# Patient Record
Sex: MALE | Race: WHITE | Employment: UNEMPLOYED | URBAN - METROPOLITAN AREA
[De-identification: names, ages, dates, MRNs, and addresses within clinical notes are randomized per-mention and may not be internally consistent; named-entity substitution may affect disease eponyms.]

---

## 2019-01-16 ENCOUNTER — HOSPITAL ENCOUNTER (INPATIENT)
Facility: CLINIC | Age: 34
LOS: 3 days | Discharge: HOME | DRG: 871 | End: 2019-01-19
Attending: EMERGENCY MEDICINE | Admitting: HOSPITALIST
Payer: COMMERCIAL

## 2019-01-16 ENCOUNTER — APPOINTMENT (OUTPATIENT)
Dept: CT IMAGING | Facility: CLINIC | Age: 34
DRG: 871 | End: 2019-01-16
Payer: COMMERCIAL

## 2019-01-16 ENCOUNTER — TRANSFERRED RECORDS (OUTPATIENT)
Dept: HEALTH INFORMATION MANAGEMENT | Facility: CLINIC | Age: 34
End: 2019-01-16

## 2019-01-16 DIAGNOSIS — J18.9 PNEUMONIA OF RIGHT MIDDLE LOBE DUE TO INFECTIOUS ORGANISM: ICD-10-CM

## 2019-01-16 DIAGNOSIS — E87.1 HYPONATREMIA: ICD-10-CM

## 2019-01-16 DIAGNOSIS — K85.90 ACUTE PANCREATITIS, UNSPECIFIED COMPLICATION STATUS, UNSPECIFIED PANCREATITIS TYPE: ICD-10-CM

## 2019-01-16 LAB
ALBUMIN SERPL-MCNC: 2.8 G/DL (ref 3.4–5)
ALP SERPL-CCNC: 114 U/L (ref 40–150)
ALT SERPL W P-5'-P-CCNC: 83 U/L (ref 0–70)
ANION GAP SERPL CALCULATED.3IONS-SCNC: 12 MMOL/L (ref 3–14)
AST SERPL W P-5'-P-CCNC: 42 U/L (ref 0–45)
BASOPHILS # BLD AUTO: 0 10E9/L (ref 0–0.2)
BASOPHILS NFR BLD AUTO: 0.1 %
BILIRUB SERPL-MCNC: 1 MG/DL (ref 0.2–1.3)
BUN SERPL-MCNC: 13 MG/DL (ref 7–30)
CALCIUM SERPL-MCNC: 8.2 MG/DL (ref 8.5–10.1)
CHLORIDE SERPL-SCNC: 91 MMOL/L (ref 94–109)
CO2 SERPL-SCNC: 22 MMOL/L (ref 20–32)
CREAT SERPL-MCNC: 0.84 MG/DL (ref 0.66–1.25)
DIFFERENTIAL METHOD BLD: ABNORMAL
EOSINOPHIL # BLD AUTO: 0 10E9/L (ref 0–0.7)
EOSINOPHIL NFR BLD AUTO: 0 %
ERYTHROCYTE [DISTWIDTH] IN BLOOD BY AUTOMATED COUNT: 11.7 % (ref 10–15)
GFR SERPL CREATININE-BSD FRML MDRD: >90 ML/MIN/{1.73_M2}
GLUCOSE SERPL-MCNC: 118 MG/DL (ref 70–99)
HCT VFR BLD AUTO: 33.5 % (ref 40–53)
HGB BLD-MCNC: 12 G/DL (ref 13.3–17.7)
IMM GRANULOCYTES # BLD: 0.1 10E9/L (ref 0–0.4)
IMM GRANULOCYTES NFR BLD: 0.4 %
LIPASE SERPL-CCNC: 964 U/L (ref 73–393)
LYMPHOCYTES # BLD AUTO: 1.1 10E9/L (ref 0.8–5.3)
LYMPHOCYTES NFR BLD AUTO: 6.4 %
MCH RBC QN AUTO: 30.1 PG (ref 26.5–33)
MCHC RBC AUTO-ENTMCNC: 35.8 G/DL (ref 31.5–36.5)
MCV RBC AUTO: 84 FL (ref 78–100)
MONOCYTES # BLD AUTO: 1.5 10E9/L (ref 0–1.3)
MONOCYTES NFR BLD AUTO: 9.3 %
NEUTROPHILS # BLD AUTO: 13.8 10E9/L (ref 1.6–8.3)
NEUTROPHILS NFR BLD AUTO: 83.8 %
NRBC # BLD AUTO: 0 10*3/UL
NRBC BLD AUTO-RTO: 0 /100
PLATELET # BLD AUTO: 267 10E9/L (ref 150–450)
POTASSIUM SERPL-SCNC: 3.4 MMOL/L (ref 3.4–5.3)
PROT SERPL-MCNC: 7.2 G/DL (ref 6.8–8.8)
RBC # BLD AUTO: 3.99 10E12/L (ref 4.4–5.9)
SODIUM SERPL-SCNC: 125 MMOL/L (ref 133–144)
WBC # BLD AUTO: 16.5 10E9/L (ref 4–11)

## 2019-01-16 PROCEDURE — 25000128 H RX IP 250 OP 636: Performed by: EMERGENCY MEDICINE

## 2019-01-16 PROCEDURE — 83690 ASSAY OF LIPASE: CPT | Performed by: EMERGENCY MEDICINE

## 2019-01-16 PROCEDURE — 74177 CT ABD & PELVIS W/CONTRAST: CPT

## 2019-01-16 PROCEDURE — 99223 1ST HOSP IP/OBS HIGH 75: CPT | Mod: AI | Performed by: HOSPITALIST

## 2019-01-16 PROCEDURE — 25000125 ZZHC RX 250: Performed by: EMERGENCY MEDICINE

## 2019-01-16 PROCEDURE — 85025 COMPLETE CBC W/AUTO DIFF WBC: CPT | Performed by: EMERGENCY MEDICINE

## 2019-01-16 PROCEDURE — 99285 EMERGENCY DEPT VISIT HI MDM: CPT | Mod: 25

## 2019-01-16 PROCEDURE — 25000132 ZZH RX MED GY IP 250 OP 250 PS 637: Performed by: EMERGENCY MEDICINE

## 2019-01-16 PROCEDURE — 96361 HYDRATE IV INFUSION ADD-ON: CPT

## 2019-01-16 PROCEDURE — 96367 TX/PROPH/DG ADDL SEQ IV INF: CPT

## 2019-01-16 PROCEDURE — 12000000 ZZH R&B MED SURG/OB

## 2019-01-16 PROCEDURE — 96365 THER/PROPH/DIAG IV INF INIT: CPT | Mod: 59

## 2019-01-16 PROCEDURE — 80053 COMPREHEN METABOLIC PANEL: CPT | Performed by: EMERGENCY MEDICINE

## 2019-01-16 RX ORDER — LORATADINE 10 MG/1
10 TABLET ORAL DAILY
COMMUNITY

## 2019-01-16 RX ORDER — HYDROMORPHONE HYDROCHLORIDE 1 MG/ML
0.5 INJECTION, SOLUTION INTRAMUSCULAR; INTRAVENOUS; SUBCUTANEOUS ONCE
Status: COMPLETED | OUTPATIENT
Start: 2019-01-16 | End: 2019-01-16

## 2019-01-16 RX ORDER — MULTIVITAMIN,THERAPEUTIC
1 TABLET ORAL DAILY
COMMUNITY

## 2019-01-16 RX ORDER — CEFTRIAXONE 2 G/1
2 INJECTION, POWDER, FOR SOLUTION INTRAMUSCULAR; INTRAVENOUS ONCE
Status: COMPLETED | OUTPATIENT
Start: 2019-01-16 | End: 2019-01-16

## 2019-01-16 RX ORDER — SODIUM CHLORIDE 9 MG/ML
1000 INJECTION, SOLUTION INTRAVENOUS CONTINUOUS
Status: DISCONTINUED | OUTPATIENT
Start: 2019-01-16 | End: 2019-01-17

## 2019-01-16 RX ORDER — IOPAMIDOL 755 MG/ML
103 INJECTION, SOLUTION INTRAVASCULAR ONCE
Status: COMPLETED | OUTPATIENT
Start: 2019-01-16 | End: 2019-01-16

## 2019-01-16 RX ORDER — CARBAMAZEPINE 200 MG/1
400 TABLET ORAL AT BEDTIME
COMMUNITY

## 2019-01-16 RX ORDER — CARBAMAZEPINE 200 MG/1
200 TABLET ORAL EVERY MORNING
COMMUNITY

## 2019-01-16 RX ORDER — IBUPROFEN 600 MG/1
600 TABLET, FILM COATED ORAL ONCE
Status: COMPLETED | OUTPATIENT
Start: 2019-01-16 | End: 2019-01-16

## 2019-01-16 RX ADMIN — SODIUM CHLORIDE 71 ML: 9 INJECTION, SOLUTION INTRAVENOUS at 21:57

## 2019-01-16 RX ADMIN — SODIUM CHLORIDE 500 MG: 9 INJECTION, SOLUTION INTRAVENOUS at 23:00

## 2019-01-16 RX ADMIN — IOPAMIDOL 103 ML: 755 INJECTION, SOLUTION INTRAVENOUS at 21:57

## 2019-01-16 RX ADMIN — HYDROMORPHONE HYDROCHLORIDE 0.5 MG: 1 INJECTION, SOLUTION INTRAMUSCULAR; INTRAVENOUS; SUBCUTANEOUS at 23:36

## 2019-01-16 RX ADMIN — SODIUM CHLORIDE 1000 ML: 9 INJECTION, SOLUTION INTRAVENOUS at 21:22

## 2019-01-16 RX ADMIN — IBUPROFEN 600 MG: 600 TABLET ORAL at 23:36

## 2019-01-16 RX ADMIN — CEFTRIAXONE SODIUM 2 G: 2 INJECTION, POWDER, FOR SOLUTION INTRAMUSCULAR; INTRAVENOUS at 22:30

## 2019-01-16 RX ADMIN — SODIUM CHLORIDE 1000 ML: 9 INJECTION, SOLUTION INTRAVENOUS at 22:31

## 2019-01-16 SDOH — HEALTH STABILITY: MENTAL HEALTH: HOW OFTEN DO YOU HAVE A DRINK CONTAINING ALCOHOL?: NEVER

## 2019-01-16 ASSESSMENT — ENCOUNTER SYMPTOMS
VOMITING: 0
DIARRHEA: 0
NAUSEA: 1
ABDOMINAL PAIN: 1
FEVER: 1

## 2019-01-16 ASSESSMENT — MIFFLIN-ST. JEOR: SCORE: 1811.29

## 2019-01-17 ENCOUNTER — APPOINTMENT (OUTPATIENT)
Dept: CARDIOLOGY | Facility: CLINIC | Age: 34
DRG: 871 | End: 2019-01-17
Payer: COMMERCIAL

## 2019-01-17 ENCOUNTER — APPOINTMENT (OUTPATIENT)
Dept: ULTRASOUND IMAGING | Facility: CLINIC | Age: 34
DRG: 871 | End: 2019-01-17
Payer: COMMERCIAL

## 2019-01-17 ENCOUNTER — APPOINTMENT (OUTPATIENT)
Dept: GENERAL RADIOLOGY | Facility: CLINIC | Age: 34
DRG: 871 | End: 2019-01-17
Payer: COMMERCIAL

## 2019-01-17 PROBLEM — A41.9 SEPSIS DUE TO PNEUMONIA (H): Status: ACTIVE | Noted: 2019-01-17

## 2019-01-17 PROBLEM — J18.9 SEPSIS DUE TO PNEUMONIA (H): Status: ACTIVE | Noted: 2019-01-17

## 2019-01-17 LAB
ALBUMIN SERPL-MCNC: 2.3 G/DL (ref 3.4–5)
ALP SERPL-CCNC: 100 U/L (ref 40–150)
ALT SERPL W P-5'-P-CCNC: 59 U/L (ref 0–70)
ANION GAP SERPL CALCULATED.3IONS-SCNC: 11 MMOL/L (ref 3–14)
AST SERPL W P-5'-P-CCNC: 20 U/L (ref 0–45)
BASOPHILS # BLD AUTO: 0 10E9/L (ref 0–0.2)
BASOPHILS NFR BLD AUTO: 0.1 %
BILIRUB DIRECT SERPL-MCNC: 1 MG/DL (ref 0–0.2)
BILIRUB SERPL-MCNC: 1.4 MG/DL (ref 0.2–1.3)
BUN SERPL-MCNC: 9 MG/DL (ref 7–30)
CALCIUM SERPL-MCNC: 7.7 MG/DL (ref 8.5–10.1)
CHLORIDE SERPL-SCNC: 99 MMOL/L (ref 94–109)
CO2 SERPL-SCNC: 21 MMOL/L (ref 20–32)
CREAT SERPL-MCNC: 0.84 MG/DL (ref 0.66–1.25)
DIFFERENTIAL METHOD BLD: ABNORMAL
EOSINOPHIL # BLD AUTO: 0 10E9/L (ref 0–0.7)
EOSINOPHIL NFR BLD AUTO: 0 %
ERYTHROCYTE [DISTWIDTH] IN BLOOD BY AUTOMATED COUNT: 12 % (ref 10–15)
FLUAV+FLUBV RNA SPEC QL NAA+PROBE: NEGATIVE
FLUAV+FLUBV RNA SPEC QL NAA+PROBE: NEGATIVE
GFR SERPL CREATININE-BSD FRML MDRD: >90 ML/MIN/{1.73_M2}
GLUCOSE SERPL-MCNC: 94 MG/DL (ref 70–99)
HCT VFR BLD AUTO: 31.2 % (ref 40–53)
HGB BLD-MCNC: 10.9 G/DL (ref 13.3–17.7)
IMM GRANULOCYTES # BLD: 0.1 10E9/L (ref 0–0.4)
IMM GRANULOCYTES NFR BLD: 0.5 %
LACTATE BLD-SCNC: 0.6 MMOL/L (ref 0.7–2)
LIPASE SERPL-CCNC: 511 U/L (ref 73–393)
LYMPHOCYTES # BLD AUTO: 1.1 10E9/L (ref 0.8–5.3)
LYMPHOCYTES NFR BLD AUTO: 5.8 %
MCH RBC QN AUTO: 29.5 PG (ref 26.5–33)
MCHC RBC AUTO-ENTMCNC: 34.9 G/DL (ref 31.5–36.5)
MCV RBC AUTO: 85 FL (ref 78–100)
MONOCYTES # BLD AUTO: 1.8 10E9/L (ref 0–1.3)
MONOCYTES NFR BLD AUTO: 10 %
NEUTROPHILS # BLD AUTO: 15.3 10E9/L (ref 1.6–8.3)
NEUTROPHILS NFR BLD AUTO: 83.6 %
NRBC # BLD AUTO: 0 10*3/UL
NRBC BLD AUTO-RTO: 0 /100
PLATELET # BLD AUTO: 267 10E9/L (ref 150–450)
POTASSIUM SERPL-SCNC: 3.5 MMOL/L (ref 3.4–5.3)
PROCALCITONIN SERPL-MCNC: 0.96 NG/ML
PROT SERPL-MCNC: 6.2 G/DL (ref 6.8–8.8)
RBC # BLD AUTO: 3.69 10E12/L (ref 4.4–5.9)
RSV RNA SPEC NAA+PROBE: NEGATIVE
SODIUM SERPL-SCNC: 131 MMOL/L (ref 133–144)
SPECIMEN SOURCE: NORMAL
WBC # BLD AUTO: 18.3 10E9/L (ref 4–11)

## 2019-01-17 PROCEDURE — C9113 INJ PANTOPRAZOLE SODIUM, VIA: HCPCS | Performed by: INTERNAL MEDICINE

## 2019-01-17 PROCEDURE — 99233 SBSQ HOSP IP/OBS HIGH 50: CPT | Performed by: INTERNAL MEDICINE

## 2019-01-17 PROCEDURE — 25000132 ZZH RX MED GY IP 250 OP 250 PS 637: Performed by: HOSPITALIST

## 2019-01-17 PROCEDURE — 40000264 ECHOCARDIOGRAM COMPLETE

## 2019-01-17 PROCEDURE — 25000128 H RX IP 250 OP 636: Performed by: INTERNAL MEDICINE

## 2019-01-17 PROCEDURE — 36415 COLL VENOUS BLD VENIPUNCTURE: CPT | Performed by: HOSPITALIST

## 2019-01-17 PROCEDURE — 76705 ECHO EXAM OF ABDOMEN: CPT

## 2019-01-17 PROCEDURE — 80048 BASIC METABOLIC PNL TOTAL CA: CPT | Performed by: HOSPITALIST

## 2019-01-17 PROCEDURE — 85025 COMPLETE CBC W/AUTO DIFF WBC: CPT | Performed by: HOSPITALIST

## 2019-01-17 PROCEDURE — 36415 COLL VENOUS BLD VENIPUNCTURE: CPT | Performed by: INTERNAL MEDICINE

## 2019-01-17 PROCEDURE — 12000000 ZZH R&B MED SURG/OB

## 2019-01-17 PROCEDURE — 93306 TTE W/DOPPLER COMPLETE: CPT | Mod: 26 | Performed by: INTERNAL MEDICINE

## 2019-01-17 PROCEDURE — 84145 PROCALCITONIN (PCT): CPT | Performed by: HOSPITALIST

## 2019-01-17 PROCEDURE — 71046 X-RAY EXAM CHEST 2 VIEWS: CPT

## 2019-01-17 PROCEDURE — 83690 ASSAY OF LIPASE: CPT | Performed by: HOSPITALIST

## 2019-01-17 PROCEDURE — 80076 HEPATIC FUNCTION PANEL: CPT | Performed by: HOSPITALIST

## 2019-01-17 PROCEDURE — 87040 BLOOD CULTURE FOR BACTERIA: CPT | Performed by: INTERNAL MEDICINE

## 2019-01-17 PROCEDURE — 87631 RESP VIRUS 3-5 TARGETS: CPT | Performed by: INTERNAL MEDICINE

## 2019-01-17 PROCEDURE — 25000128 H RX IP 250 OP 636: Performed by: HOSPITALIST

## 2019-01-17 PROCEDURE — 25500064 ZZH RX 255 OP 636: Performed by: HOSPITALIST

## 2019-01-17 PROCEDURE — 83605 ASSAY OF LACTIC ACID: CPT | Performed by: HOSPITALIST

## 2019-01-17 RX ORDER — AMOXICILLIN 250 MG
2 CAPSULE ORAL 2 TIMES DAILY PRN
Status: DISCONTINUED | OUTPATIENT
Start: 2019-01-17 | End: 2019-01-19 | Stop reason: HOSPADM

## 2019-01-17 RX ORDER — CEFTRIAXONE 2 G/1
2 INJECTION, POWDER, FOR SOLUTION INTRAMUSCULAR; INTRAVENOUS EVERY 24 HOURS
Status: DISCONTINUED | OUTPATIENT
Start: 2019-01-17 | End: 2019-01-19 | Stop reason: HOSPADM

## 2019-01-17 RX ORDER — SODIUM CHLORIDE 9 MG/ML
INJECTION, SOLUTION INTRAVENOUS CONTINUOUS
Status: DISCONTINUED | OUTPATIENT
Start: 2019-01-17 | End: 2019-01-17

## 2019-01-17 RX ORDER — ACETAMINOPHEN 650 MG/1
650 SUPPOSITORY RECTAL EVERY 4 HOURS PRN
Status: DISCONTINUED | OUTPATIENT
Start: 2019-01-17 | End: 2019-01-19 | Stop reason: HOSPADM

## 2019-01-17 RX ORDER — LIDOCAINE 40 MG/G
CREAM TOPICAL
Status: DISCONTINUED | OUTPATIENT
Start: 2019-01-17 | End: 2019-01-19 | Stop reason: HOSPADM

## 2019-01-17 RX ORDER — SODIUM CHLORIDE 9 MG/ML
INJECTION, SOLUTION INTRAVENOUS CONTINUOUS
Status: DISCONTINUED | OUTPATIENT
Start: 2019-01-17 | End: 2019-01-19 | Stop reason: HOSPADM

## 2019-01-17 RX ORDER — POLYETHYLENE GLYCOL 3350 17 G/17G
17 POWDER, FOR SOLUTION ORAL DAILY PRN
Status: DISCONTINUED | OUTPATIENT
Start: 2019-01-17 | End: 2019-01-19 | Stop reason: HOSPADM

## 2019-01-17 RX ORDER — HYDROMORPHONE HYDROCHLORIDE 1 MG/ML
0.2 INJECTION, SOLUTION INTRAMUSCULAR; INTRAVENOUS; SUBCUTANEOUS
Status: DISCONTINUED | OUTPATIENT
Start: 2019-01-17 | End: 2019-01-19 | Stop reason: HOSPADM

## 2019-01-17 RX ORDER — ONDANSETRON 4 MG/1
4 TABLET, ORALLY DISINTEGRATING ORAL EVERY 6 HOURS PRN
Status: DISCONTINUED | OUTPATIENT
Start: 2019-01-17 | End: 2019-01-19 | Stop reason: HOSPADM

## 2019-01-17 RX ORDER — BISACODYL 10 MG
10 SUPPOSITORY, RECTAL RECTAL DAILY PRN
Status: DISCONTINUED | OUTPATIENT
Start: 2019-01-17 | End: 2019-01-19 | Stop reason: HOSPADM

## 2019-01-17 RX ORDER — ONDANSETRON 2 MG/ML
4 INJECTION INTRAMUSCULAR; INTRAVENOUS EVERY 6 HOURS PRN
Status: DISCONTINUED | OUTPATIENT
Start: 2019-01-17 | End: 2019-01-19 | Stop reason: HOSPADM

## 2019-01-17 RX ORDER — ALBUTEROL SULFATE 0.83 MG/ML
3 SOLUTION RESPIRATORY (INHALATION)
Status: DISCONTINUED | OUTPATIENT
Start: 2019-01-17 | End: 2019-01-19 | Stop reason: HOSPADM

## 2019-01-17 RX ORDER — CARBAMAZEPINE 200 MG/1
200 TABLET ORAL EVERY MORNING
Status: DISCONTINUED | OUTPATIENT
Start: 2019-01-17 | End: 2019-01-19 | Stop reason: HOSPADM

## 2019-01-17 RX ORDER — NALOXONE HYDROCHLORIDE 0.4 MG/ML
.1-.4 INJECTION, SOLUTION INTRAMUSCULAR; INTRAVENOUS; SUBCUTANEOUS
Status: DISCONTINUED | OUTPATIENT
Start: 2019-01-17 | End: 2019-01-19 | Stop reason: HOSPADM

## 2019-01-17 RX ORDER — OXYCODONE HYDROCHLORIDE 5 MG/1
5-10 TABLET ORAL
Status: DISCONTINUED | OUTPATIENT
Start: 2019-01-17 | End: 2019-01-19 | Stop reason: HOSPADM

## 2019-01-17 RX ORDER — OSELTAMIVIR PHOSPHATE 75 MG/1
75 CAPSULE ORAL 2 TIMES DAILY
Status: DISCONTINUED | OUTPATIENT
Start: 2019-01-17 | End: 2019-01-17

## 2019-01-17 RX ORDER — ACETAMINOPHEN 325 MG/1
650 TABLET ORAL EVERY 4 HOURS PRN
Status: DISCONTINUED | OUTPATIENT
Start: 2019-01-17 | End: 2019-01-19 | Stop reason: HOSPADM

## 2019-01-17 RX ORDER — BISACODYL 10 MG
10 SUPPOSITORY, RECTAL RECTAL DAILY PRN
Status: DISCONTINUED | OUTPATIENT
Start: 2019-01-17 | End: 2019-01-17

## 2019-01-17 RX ORDER — AMOXICILLIN 250 MG
1 CAPSULE ORAL 2 TIMES DAILY PRN
Status: DISCONTINUED | OUTPATIENT
Start: 2019-01-17 | End: 2019-01-19 | Stop reason: HOSPADM

## 2019-01-17 RX ORDER — LANOLIN ALCOHOL/MO/W.PET/CERES
3 CREAM (GRAM) TOPICAL
Status: DISCONTINUED | OUTPATIENT
Start: 2019-01-17 | End: 2019-01-19 | Stop reason: HOSPADM

## 2019-01-17 RX ORDER — CARBAMAZEPINE 200 MG/1
400 TABLET ORAL AT BEDTIME
Status: DISCONTINUED | OUTPATIENT
Start: 2019-01-17 | End: 2019-01-19 | Stop reason: HOSPADM

## 2019-01-17 RX ADMIN — CARBAMAZEPINE 200 MG: 200 TABLET ORAL at 09:54

## 2019-01-17 RX ADMIN — CEFTRIAXONE SODIUM 2 G: 2 INJECTION, POWDER, FOR SOLUTION INTRAMUSCULAR; INTRAVENOUS at 21:01

## 2019-01-17 RX ADMIN — PANTOPRAZOLE SODIUM 40 MG: 40 INJECTION, POWDER, FOR SOLUTION INTRAVENOUS at 20:44

## 2019-01-17 RX ADMIN — OXYCODONE HYDROCHLORIDE 10 MG: 5 TABLET ORAL at 01:29

## 2019-01-17 RX ADMIN — Medication 10 MG: at 18:30

## 2019-01-17 RX ADMIN — SODIUM CHLORIDE: 9 INJECTION, SOLUTION INTRAVENOUS at 01:36

## 2019-01-17 RX ADMIN — PANTOPRAZOLE SODIUM 40 MG: 40 INJECTION, POWDER, FOR SOLUTION INTRAVENOUS at 18:29

## 2019-01-17 RX ADMIN — HUMAN ALBUMIN MICROSPHERES AND PERFLUTREN 9 ML: 10; .22 INJECTION, SOLUTION INTRAVENOUS at 13:52

## 2019-01-17 RX ADMIN — AZITHROMYCIN MONOHYDRATE 250 MG: 500 INJECTION, POWDER, LYOPHILIZED, FOR SOLUTION INTRAVENOUS at 21:52

## 2019-01-17 RX ADMIN — OXYCODONE HYDROCHLORIDE 5 MG: 5 TABLET ORAL at 20:52

## 2019-01-17 RX ADMIN — CARBAMAZEPINE 400 MG: 200 TABLET ORAL at 18:29

## 2019-01-17 RX ADMIN — SODIUM CHLORIDE: 9 INJECTION, SOLUTION INTRAVENOUS at 14:23

## 2019-01-17 RX ADMIN — GUAIFENESIN 10 ML: 200 SOLUTION ORAL at 01:29

## 2019-01-17 ASSESSMENT — ACTIVITIES OF DAILY LIVING (ADL)
ADLS_ACUITY_SCORE: 12
ADLS_ACUITY_SCORE: 14
ADLS_ACUITY_SCORE: 14
ADLS_ACUITY_SCORE: 12
ADLS_ACUITY_SCORE: 14

## 2019-01-17 NOTE — PLAN OF CARE
A&O x4, very anxious.  Tachycardic on RA.  Frequent cough, denies SOB.  Reports abd pain, managed with oxycodone.  Pt is blind.  Regular diet, tolerating well.  Up with SBA.  Plan for echocardiogram in AM.

## 2019-01-17 NOTE — PROVIDER NOTIFICATION
MD Notification    Notified Person: MD    Notified Person Name: Gordon    Notification Date/Time: 01/17/19 @ 4:43 PM     Notification Interaction: web paged MD    Purpose of Notification: Influenza PCR negative, can we discontinue tamiflu and droplet isolation? Thank you!    Orders Received: MD discontinued tamiflu    Comments: Pt never received dose of tamiflu, as it was not available from pharmacy.

## 2019-01-17 NOTE — PROVIDER NOTIFICATION
MD Notification    Notified Person: MD    Notified Person Name: Gordon    Notification Date/Time: 01/17/19 @ 5:57 PM     Notification Interaction: web paged    Purpose of Notification: pt complaining of increased abdominal pain with eating, wondering if MD wants patient NPO or on clear liquids.    Orders Received: Change patient to clear liquid diet. Offer dulcolax suppository, as patient has not had a good BM since admission. Added IV protonix.    Comments: Pt's abdominal distention is bother him - feels like it is getting worse.      6:05 PM   Per MD if patient continues to have worsening pain, ask house MD to come assess patient as he cannot communicate his symptoms as well.

## 2019-01-17 NOTE — PROGRESS NOTES
[facility]    Hospitalist Progress Note    Assessment & Plan   Chance Louis is a markedly pleasant 33 year old gentleman with childhood hydrocephalus status post  shunt placement, as well as seizure disorder and mild cognitive deficits who presents with cough, fever and abdominal pain and is found to have suspected sepsis due to right lower lobe pneumonia.     Sepsis due to right lower lobe pneumonia:   -Possibly post-viral by history.   -Check influenza PCR, empirically start on Tamiflu until results are back  -Chest x-ray consistent with pneumonia of the medial right lower lobe  -Continue ceftriaxone and azithromycin  -Blood culture today  -Monitor fever profile  -Not hypoxic at this time  -Leukocytosis mildly worse today, monitor that closely.    Probable acute pancreatitis  -Presenting with nonspecific abdominal pain and lipase of 964   -Unclear if this is due to acute pancreatitis or other etiologies  -Patient unsure if his pain is due to incessant coughing in the last few days   -Transaminases are now normal however bilirubin is mildly elevated  -Right upper quadrant ultrasonogram is unremarkable, no cholelithiasis although CBD is not adequately visualized  -Check LFTs in the morning  -Patient already on a regular diet and tolerating well without any worsening of pain  -Continue IV fluids   -If bilirubin not improving or worse GI consult in the morning    Hyponatremia:  -Most likely hypovolemic  -Sodium at presentation was 125  -Improved to 130 with IV hydration     Normocytic anemia:   -Baseline appears to be normal between 14-15  -Hemoglobin at presentation was 12, slightly lower today at 10.9, likely dilutional, continue to monitor.     Seizure disorder  Mental retardation  Congenital hydrocephalus status post   shunt placement in the remote past  Legally blind  -Continue prior to admission Tegretol      # Pain Assessment:  Current Pain Score 1/17/2019   Patient currently in pain?  yes   Pain score (0-10) 10   - Chance is experiencing pain due to abdominal pain. Pain management was discussed with Chance and his mother and the plan was created in a collaborative fashion.  Chance's response to the current recommendations: engaged  - Please see the plan for pain management as documented above        D/W: RN  DVT Prophylaxis: Pneumatic Compression Devices  Code Status: Full Code    Disposition: Expected discharge in 2 + days    Mike Leyva MD    Interval History    Febrile this morning at 100.8 with a T-max of 102.3.  Endorses dry cough.  Generalized abdominal pain made worse by coughing.  No dysuria.  Feels a little distended and bloated.    -Data reviewed today: I reviewed all new labs and imaging results over the last 24 hours. I personally reviewed no images or EKG's today.      Physical Exam   Temp: 100.8  F (38.2  C) Temp src: Oral BP: 121/62 Pulse: 132 Heart Rate: 124 Resp: 18 SpO2: 94 % O2 Device: None (Room air)    Vitals:    01/16/19 2055   Weight: 93.4 kg (206 lb)     Vital Signs with Ranges  Temp:  [99.4  F (37.4  C)-102.3  F (39.1  C)] 100.8  F (38.2  C)  Pulse:  [127-132] 132  Heart Rate:  [123-133] 124  Resp:  [16-18] 18  BP: (121-144)/() 121/62  SpO2:  [94 %-99 %] 94 %  I/O last 3 completed shifts:  In: 952 [P.O.:600; I.V.:352]  Out: -     Constitutional: AAOX3, NAD, appears anxious  HEENT:  No pallor or icterus  Neck- Supple, Good ROM, No JVD  Respiratory:  No crackles, No wheezes, CTA B/L, Normal WOB  Cardiovascular: Tachycardic, No murmur  GI: Soft, slightly distended, mild nonspecific generalized tenderness, no guarding or rebound or rigidity, bowel sounds normoactive  Skin/Integument: Warm and dry, no rashes  MSK: No joint deformity or swelling, no edema  Neuro: CN- grossly intact       Medications       sodium chloride 75 mL/hr at 01/17/19 0136         sodium chloride 0.9%  1,000 mL Intravenous Once     azithromycin  250 mg Intravenous Q24H      carBAMazepine  200 mg Oral QAM     carBAMazepine  400 mg Oral At Bedtime     cefTRIAXone  2 g Intravenous Q24H     sodium chloride (PF)  3 mL Intracatheter Q8H       Data     Recent Labs   Lab 01/17/19  0732 01/16/19  2110   WBC 18.3* 16.5*   HGB 10.9* 12.0*   MCV 85 84    267   * 125*   POTASSIUM 3.5 3.4   CHLORIDE 99 91*   CO2 21 22   BUN 9 13   CR 0.84 0.84   ANIONGAP 11 12   IDALIA 7.7* 8.2*   GLC 94 118*   ALBUMIN 2.3* 2.8*   PROTTOTAL 6.2* 7.2   BILITOTAL 1.4* 1.0   ALKPHOS 100 114   ALT 59 83*   AST 20 42   LIPASE 511* 964*       Recent Results (from the past 24 hour(s))   CT Abdomen Pelvis w Contrast    Narrative    CT ABDOMEN AND PELVIS WITH CONTRAST   1/16/2019 10:02 PM     HISTORY: Abdominal pain, distention and fever. Elevated white blood  cell count.    COMPARISON: None.    TECHNIQUE: Following the uneventful administration of 103 mL  Isovue-370 intravenous contrast, helical sections were acquired from  the top of the diaphragm through the pubic symphysis. Coronal  reconstructions were generated. Radiation dose for this scan was  reduced using automated exposure control, adjustment of the mA and/or  kV according to the patient's size, or iterative reconstruction  technique.    FINDINGS:     Abdomen: The liver, spleen, pancreas, adrenal glands and kidneys are  unremarkable. The gallbladder is present. No enlarged lymph nodes or  free fluid in the upper abdomen. A ventriculoperitoneal shunt catheter  is present coursing in the right anterior abdominal wall with distal  tip in the upper right hemiabdomen.    Scan through the lower chest is significant for partial visualization  of a small to moderate-sized region of airspace opacities in the  medial aspect of the right lower lobe.    Pelvis: The small and large bowel are normal in caliber. The appendix  is unremarkable. No bowel wall thickening, pneumatosis or free  intraperitoneal gas. No enlarged lymph nodes or free fluid in the  pelvis.       Impression    IMPRESSION:   1. Partial visualization of a small to moderate-sized region of  airspace opacities in the right lung base, likely representing  pneumonia.  2. No other cause of acute pain identified in the abdomen or pelvis.  The appendix is unremarkable.    MERRY MCKEON MD   XR Chest 2 Views    Narrative    CHEST TWO VIEWS  1/17/2019 8:37 AM     HISTORY: 33-year-old patient with pneumonia suspected on CT exam.    COMPARISON: None       Impression    IMPRESSION: Consolidation is noted in the medial right lower lobe  corresponding to pneumonia. Recommend continued radiographic  surveillance until resolution. No pneumothorax or pleural effusion.  Catheter is noted overlying the right hemithorax, discontinuous  overlying the right lung apex, likely a previous  shunt.    ROGELIO MEZA MD   US Abdomen Limited    Narrative    RIGHT UPPER QUADRANT ULTRASOUND 1/17/2019 9:03 AM    HISTORY:  Elevated lipase.    COMPARISON: CT of the abdomen pelvis dated 1/16/2019    FINDINGS:    Gallbladder: Suboptimal visualization but grossly unremarkable.    Bile ducts:  The common bile duct is not able to be visualized.    Liver:  Unremarkable    Pancreas:  The tail is obscured by overlying bowel gas. Head and body  of the pancreas appear grossly unremarkable.    Right kidney:  Unremarkable    Abdominal aorta/inferior vena cava Unremarkable      Impression    IMPRESSION:  No definite acute abnormality. The common bile duct is  not adequately visualized but appeared unremarkable on the previous  CT.    KATARINA SINGH MD

## 2019-01-17 NOTE — PROGRESS NOTES
RECEIVING UNIT ED HANDOFF REVIEW    ED Nurse Handoff Report was reviewed by: Yasmani Dove on January 17, 2019 at 12:00 AM

## 2019-01-17 NOTE — ED NOTES
"[facility]  ED Nurse Handoff Report    ED Chief complaint: Abdominal Pain      ED Diagnosis:   Final diagnoses:   Pneumonia of right middle lobe due to infectious organism (H)   Acute pancreatitis, unspecified complication status, unspecified pancreatitis type   Hyponatremia       Code Status: Full Code    Allergies: No Known Allergies    Activity level - Baseline/Home:  Independent    Activity Level - Current:   Independent     Needed?: No    Isolation: No  Infection: Not Applicable  Bariatric?: No    Vital Signs:   Vitals:    01/16/19 2055 01/16/19 2232   BP: (!) 134/104 (!) 144/91   Resp: 18 16   Temp: 99.4  F (37.4  C)    TempSrc: Oral    SpO2: 96% 98%   Weight: 93.4 kg (206 lb)    Height: 1.651 m (5' 5\")        Cardiac Rhythm: ,        Pain level:      Is this patient confused?: No Patient is blind  Does this patient have a guardian?  Yes         If yes, is there guardianship documents in the Epic \"Code/ACP\" activity?  No but parents present and state guardianship[         Guardian Notified?  Yes  [place] - Suicide Severity Rating Scale Completed?  Yes  If yes, what color did the patient score?  White    Patient Report: Initial Complaint: Abdominal pain  Focused Assessment: Pt states entire abdomen but focus on right lower quad is tender starting on Sunday. Pt felt nauseated but has not vomited. Stating pain 10/10. Pt had hydrocephalus as a baby and is on seizure medications. Pt lives with his parents and is blind.   Tests Performed: labs and imaging  Abnormal Results: see labs and imaging  Treatments provided: antibiotics, fluids 2L    Family Comments: Pt has not been feeling wee for a couple of days    OBS brochure/video discussed/provided to patient/family: N/A              Name of person given brochure if not patient: none              Relationship to patient: none    ED Medications:   Medications   0.9% sodium chloride BOLUS (1,000 mLs Intravenous New Bag 1/16/19 2122)     " Followed by   sodium chloride 0.9% infusion (1,000 mLs Intravenous New Bag 1/16/19 2231)   cefTRIAXone (ROCEPHIN) 2 g vial to attach to  ml bag for ADULTS or NS 50 ml bag for PEDS (2 g Intravenous New Bag 1/16/19 2230)   azithromycin (ZITHROMAX) 500 mg in sodium chloride 0.9 % 250 mL intermittent infusion (not administered)   Saline Flush - CT (71 mLs Intravenous Given 1/16/19 2157)   iopamidol (ISOVUE-370) solution 103 mL (103 mLs Intravenous Given 1/16/19 2157)       Drips infusing?:  Yes fluids and antibiotics    For the majority of the shift this patient was Green.   Interventions performed were none.    Severe Sepsis OR Septic Shock Diagnosis Present: No    To be done/followed up on inpatient unit:  continue to monitor for pain and fever. Pt 99.4 in ED after taking tylenol, antibiotics and possible US of belly    ED NURSE PHONE NUMBER: [phone]

## 2019-01-17 NOTE — PROGRESS NOTES
[facility] Progress Note  [facility]    Visit with pt, per request in chart.  Pt was alone, lying in bed, when I arrived.    Pt tearfully shared with me his understanding of his medical situation.  He is very fearful about all that is going on in the hospital, specifically noting a test/scan which frightened him.  (It seems possible that his fear is exacerbated by his vision impairment and also perhaps cognitive issues.)    Pt said he's never been in the hospital before, and in conversation he was unable to recall other times when he's experienced difficulty and recovery.    Pt is Religious, and he said that he may have visitors today including his parents and perhaps elders from the [employer] he [employer].    Pt was receptive to my offer of prayer, gentle touch (hand-holding) and breath work.    Provided support and reassurance.  SH team is available and I will attempt to visit again tomorrow if pt remains hospitalized.                                                                                                                                               Diana Poole M.A.  Staff   Pager [phone]  Phone [phone]

## 2019-01-17 NOTE — H&P
[facility]    History and Physical  Hospitalist       Date of Admission:  1/16/2019  Date of Service (when I saw the patient): 01/16/19    ASSESSMENT  Chance Louis is a markedly pleasant 33 year old gentleman with childhood hydrocephalus status post  shunt placement, as well as seizure disorder and mild cognitive deficits who presents with cough, fever and abdominal pain and is found to have suspected sepsis due to right lower lobe pneumonia.    PLAN    1) Sepsis due to right lower lobe pneumonia: Possibly post-viral by history. He has tachycardia, leukocytosis and right baser infiltrate as well as concomitant mild metabolic acidosis.       -- Inpatient. Albuterol nebs every 2 hours as needed. Ceftriaxone and Azithromycin continued as we follow up sputum cultures.. Monitor oxygenation. Robitussin ordered as needed for cough. Tylenol, Oxycodone, IV Dilaudid as needed for pain. Anti-emetics as needed.       -- Check urine strep and Legionella antigens     --  If headache persists, could consider a shunt series for further evaluation    2) Anemia: Unknown baseline; monitor while hospitalized    3) Hypochloremic hyponatremia: Could be hypovolemic. 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 Could also be due to SIADH due to pneumonia.        -- 75 ml/hour NS IV fluid ordered; follow up AM labs closely; consider further testing if abnormalities persist    4) Weight gain, abdominal distension, dyspnea: Could have CHF due to acute illness.      -- TTE ordered. Monitor sats. Stop fluid if respiratory symptoms worsen.    5) Elevated Lipase and isolated elevation in ALT: cause unclear; given abdominal pain pancreatitis considered, but CT was negative for signs of that and his pain could be muscle strain due to coughing.      -- Monitor; repeat CMP in AM       -- Consider abdominal ultrasound if pain continues    6) Seizure disorder: resume Tegretol when verified    7) Panic attacks: Would order  "1 mg Ativan if needed    Chief Complaint   Cough    History is obtained from the patient, his mother and other family members at the bedside, and the ED physician whom I have spoken with    History of Present Illness   Chance Louis is a markedly pleasant 33 year old gentleman who presents with 4 days of sudden onset, progressively worsening severe dry cough; he feels he can not get any sputum up. Multiple family members with whom he lives have recently had similar symptoms. He has had associated loose stool as well as nausea without emesis. A day or so after the cough started he noticed onset of diffuse sharp abdominal pain that is exacerbated by the cough or by movement. He has also noticed fevers and intermittent dull headaches, as well as severe thirst and drinking a lot of water at home, whereby he feels he has gained 5-10 pounds in the past few days and has also developed shortness of breath, especially when he exerts himself.. His family add that he had hydrocephalus as a child and had two seizures after shunt placement so was started on tegretol but hasn't had another seizure in many years. He has not needed to see a Neurologist in a long time. He has severe chronic anxiety and mild chronic cognitive deficits and his mother adds that she has also become his legal guardian. He has never smoked and drinks alcohol occasionally, but hasn't had any for at least a month. Otherwise he denies any other acute complaints. They went to an Urgent care first here high WBC and CXR abnormalities were noted so they were advised to come to our ED.    In the ED, Blood pressure (!) 144/91, temperature 99.4  F (37.4  C), temperature source Oral, resp. rate 16, height 1.651 m (5' 5\"), weight 93.4 kg (206 lb), SpO2 98 %.    Heart rates 110-130 in the ED.    CBC and CMP were done and notable for WBC 16.5, HGB 12, na 125, Cl 91, CO2 22, ALT 83, Albumin 2.8, otherwise in normal range. Lipase elevated at 964. CT abdomen and " "pelvis showed a large right base infiltrate in the lung without other acute pathology. He was given Iv fluid, Ceftriaxone, and Azithromycin.    Data   Labs Ordered and Resulted from Time of ED Arrival Up to the Time of Departure from the ED   CBC WITH PLATELETS DIFFERENTIAL - Abnormal; Notable for the following components:       Result Value    WBC 16.5 (*)     RBC Count 3.99 (*)     Hemoglobin 12.0 (*)     Hematocrit 33.5 (*)     Absolute Neutrophil 13.8 (*)     Absolute Monocytes 1.5 (*)     All other components within normal limits   COMPREHENSIVE METABOLIC PANEL - Abnormal; Notable for the following components:    Sodium 125 (*)     Chloride 91 (*)     Glucose 118 (*)     Calcium 8.2 (*)     Albumin 2.8 (*)     ALT 83 (*)     All other components within normal limits   LIPASE - Abnormal; Notable for the following components:    Lipase 964 (*)     All other components within normal limits   PERIPHERAL IV CATHETER   FREE WATER       PHYSICAL EXAM  Blood pressure (!) 144/91, temperature 99.4  F (37.4  C), temperature source Oral, resp. rate 16, height 1.651 m (5' 5\"), weight 93.4 kg (206 lb), SpO2 98 %.  Constitutional: Alert and oriented to person, place and time; no apparent distress  HEENT: normocephalic moist mucus membranes  Respiratory: lungs have bi-basilar crackles to auscultation bilaterally  Cardiovascular: regular tachycardic S1 S2   GI: abdomen soft but diffusely tender mildly distended bowel sounds positive  Lymph/Hematologic: no pallor, no cervical lymphadenopathy  Skin: no rash, good turgor  Musculoskeletal: no clubbing, cyanosis or edema  Neurologic: extra-ocular muscles intact; moves all four extremities  Psychiatric: anxious affect, speech tangential at times    Data reviewed today:  I personally reviewed the abdominal CT image(s) showing right lung pneumonia.    Recent Results (from the past 24 hour(s))   CT Abdomen Pelvis w Contrast    Narrative    CT ABDOMEN AND PELVIS WITH CONTRAST   1/16/2019 " 10:02 PM     HISTORY: Abdominal pain, distention and fever. Elevated white blood  cell count.    COMPARISON: None.    TECHNIQUE: Following the uneventful administration of 103 mL  Isovue-370 intravenous contrast, helical sections were acquired from  the top of the diaphragm through the pubic symphysis. Coronal  reconstructions were generated. Radiation dose for this scan was  reduced using automated exposure control, adjustment of the mA and/or  kV according to the patient's size, or iterative reconstruction  technique.    FINDINGS:     Abdomen: The liver, spleen, pancreas, adrenal glands and kidneys are  unremarkable. Gallbladder is present. No enlarged lymph nodes or free  fluid in the upper abdomen. A ventriculoperitoneal shunt catheter is  present coursing in the right anterior abdominal wall with distal tip  in the upper right hemiabdomen.    Scan through the lower chest is significant for partial visualization  of a small to moderate-sized region of airspace opacities in the  medial aspect of the right lower lobe.    Pelvis: The small and large bowel are normal in caliber. The appendix  is unremarkable. No bowel wall thickening, pneumatosis or free  intraperitoneal gas. No enlarged lymph nodes or free fluid in the  pelvis.      Impression    IMPRESSION:   1. Partial visualization of a small to moderate-sized region of  airspace opacities in the right lung base, likely representing  pneumonia.  2. No other cause of acute pain identified in the abdomen or pelvis.  The appendix is unremarkable.       DVT Prophylaxis: Pneumatic Compression Devices  Code Status: Full Code confirmed    Disposition: Expected discharge in 2-3 days    Jorge Thomason MD    Primary Care Physician   *No primary care provider on file.    Past Medical History    I have reviewed this patient's medical history and updated it with pertinent information if needed.   Past Medical History:   Diagnosis Date     Cognitive deficits       Hydrocephalus      Panic attacks      Seizure disorder (H)        Past Surgical History   I have reviewed this patient's surgical history and updated it with pertinent information if needed.  Past Surgical History:   Procedure Laterality Date     HERNIA REPAIR      as an infant     ventriculoperitoneal shunt         Prior to Admission Medications   Prior to Admission Medications   Prescriptions Last Dose Informant Patient Reported? Taking?   Cholecalciferol (VITAMIN D PO) 1/16/2019 at Unknown time  Yes Yes   Sig: Take 1 tablet by mouth daily   carBAMazepine (TEGRETOL) 200 MG tablet 1/16/2019 at Unknown time  Yes Yes   Sig: Take 200 mg by mouth every morning   carBAMazepine (TEGRETOL) 200 MG tablet 1/15/2019 at Unknown time  Yes Yes   Sig: Take 400 mg by mouth At Bedtime   loratadine (CLARITIN) 10 MG tablet 1/16/2019 at Unknown time  Yes Yes   Sig: Take 10 mg by mouth daily   multivitamin, therapeutic (THERA-VIT) TABS tablet 1/16/2019 at Unknown time  Yes Yes   Sig: Take 1 tablet by mouth daily      Facility-Administered Medications: None     Allergies   No Known Allergies    Social History   I have reviewed this patient's social history and updated it with pertinent information if needed. Chance Louis  reports that  has never smoked. He does not have any smokeless tobacco history on file. He reports that he drinks alcohol.    Family History   Family history assessed and, except as above, is non-contributory.    Review of Systems   The 10 point Review of Systems is negative other than noted in the HPI or here.

## 2019-01-17 NOTE — ED TRIAGE NOTES
Pt coming from Urgent care and sent here with fever and abdominal pain that started Sunday and progressively worse. Pt states 10/10 pain pointing all across the bbelly. Denies diarrhea. Pt had had hydrocephalus as a baby and on tegretol. Pt fever 99.4

## 2019-01-17 NOTE — ED NOTES
MD alerted that pt remains febrile and his heart rate is still elevated. New verbal orders obtained by Dr. Pierre

## 2019-01-17 NOTE — PHARMACY-ADMISSION MEDICATION HISTORY
Admission medication history interview status for the 1/16/2019  admission is complete. See EPIC admission navigator for prior to admission medications     Medication history source reliability:Moderate    Actions taken by pharmacist (provider contacted, etc):None     Additional medication history information not noted on PTA med list :  --Med list per print out sent with pt to ED from urgent care. The print out has med doses and all the meds newly entered and family confirmed it.  Family confirms that pt is on Tegretol 2 tablets at hs and 1 tab in the AM.     Medication reconciliation/reorder completed by provider prior to medication history? No    Time spent in this activity: 12 min    Prior to Admission medications    Medication Sig Last Dose Taking? Auth Provider   carBAMazepine (TEGRETOL) 200 MG tablet Take 200 mg by mouth every morning 1/16/2019 at Unknown time Yes Unknown, Entered By History   carBAMazepine (TEGRETOL) 200 MG tablet Take 400 mg by mouth At Bedtime 1/15/2019 at Unknown time Yes Unknown, Entered By History   Cholecalciferol (VITAMIN D PO) Take 1 tablet by mouth daily 1/16/2019 at Unknown time Yes Unknown, Entered By History   loratadine (CLARITIN) 10 MG tablet Take 10 mg by mouth daily 1/16/2019 at Unknown time Yes Unknown, Entered By History   multivitamin, therapeutic (THERA-VIT) TABS tablet Take 1 tablet by mouth daily 1/16/2019 at Unknown time Yes Unknown, Entered By History       Addendum---Confirmed Carbamazepine and Loratadine dose and frequency w/[facility].  Also confirmed CBZ as normally filled generic, but last fill was a different brand as that was all they could get in. - Evelyn, PharmD

## 2019-01-18 LAB
ALBUMIN SERPL-MCNC: 2.2 G/DL (ref 3.4–5)
ALP SERPL-CCNC: 90 U/L (ref 40–150)
ALT SERPL W P-5'-P-CCNC: 46 U/L (ref 0–70)
ANION GAP SERPL CALCULATED.3IONS-SCNC: 7 MMOL/L (ref 3–14)
AST SERPL W P-5'-P-CCNC: 17 U/L (ref 0–45)
BILIRUB DIRECT SERPL-MCNC: 0.3 MG/DL (ref 0–0.2)
BILIRUB SERPL-MCNC: 0.5 MG/DL (ref 0.2–1.3)
BUN SERPL-MCNC: 11 MG/DL (ref 7–30)
CALCIUM SERPL-MCNC: 7.8 MG/DL (ref 8.5–10.1)
CHLORIDE SERPL-SCNC: 104 MMOL/L (ref 94–109)
CO2 SERPL-SCNC: 25 MMOL/L (ref 20–32)
CREAT SERPL-MCNC: 0.79 MG/DL (ref 0.66–1.25)
ERYTHROCYTE [DISTWIDTH] IN BLOOD BY AUTOMATED COUNT: 12.3 % (ref 10–15)
GFR SERPL CREATININE-BSD FRML MDRD: >90 ML/MIN/{1.73_M2}
GLUCOSE SERPL-MCNC: 95 MG/DL (ref 70–99)
HCT VFR BLD AUTO: 30 % (ref 40–53)
HGB BLD-MCNC: 10.5 G/DL (ref 13.3–17.7)
L PNEUMO1 AG UR QL IA: NORMAL
LACTATE BLD-SCNC: 0.5 MMOL/L (ref 0.7–2)
LIPASE SERPL-CCNC: 571 U/L (ref 73–393)
MCH RBC QN AUTO: 29.8 PG (ref 26.5–33)
MCHC RBC AUTO-ENTMCNC: 35 G/DL (ref 31.5–36.5)
MCV RBC AUTO: 85 FL (ref 78–100)
PLATELET # BLD AUTO: 259 10E9/L (ref 150–450)
POTASSIUM SERPL-SCNC: 3.3 MMOL/L (ref 3.4–5.3)
PROT SERPL-MCNC: 6.1 G/DL (ref 6.8–8.8)
RBC # BLD AUTO: 3.52 10E12/L (ref 4.4–5.9)
S PNEUM AG SPEC QL: NORMAL
SODIUM SERPL-SCNC: 136 MMOL/L (ref 133–144)
SPECIMEN SOURCE: NORMAL
SPECIMEN SOURCE: NORMAL
WBC # BLD AUTO: 9.5 10E9/L (ref 4–11)

## 2019-01-18 PROCEDURE — 25000132 ZZH RX MED GY IP 250 OP 250 PS 637: Performed by: INTERNAL MEDICINE

## 2019-01-18 PROCEDURE — 99207 ZZC CDG-MDM COMPONENT: MEETS LOW - DOWN CODED: CPT | Performed by: INTERNAL MEDICINE

## 2019-01-18 PROCEDURE — C9113 INJ PANTOPRAZOLE SODIUM, VIA: HCPCS | Performed by: INTERNAL MEDICINE

## 2019-01-18 PROCEDURE — 36415 COLL VENOUS BLD VENIPUNCTURE: CPT | Performed by: INTERNAL MEDICINE

## 2019-01-18 PROCEDURE — 99232 SBSQ HOSP IP/OBS MODERATE 35: CPT | Performed by: INTERNAL MEDICINE

## 2019-01-18 PROCEDURE — 80048 BASIC METABOLIC PNL TOTAL CA: CPT | Performed by: INTERNAL MEDICINE

## 2019-01-18 PROCEDURE — 87899 AGENT NOS ASSAY W/OPTIC: CPT | Performed by: INTERNAL MEDICINE

## 2019-01-18 PROCEDURE — 25000128 H RX IP 250 OP 636: Performed by: HOSPITALIST

## 2019-01-18 PROCEDURE — 83690 ASSAY OF LIPASE: CPT | Performed by: INTERNAL MEDICINE

## 2019-01-18 PROCEDURE — 25000132 ZZH RX MED GY IP 250 OP 250 PS 637: Performed by: HOSPITALIST

## 2019-01-18 PROCEDURE — 80076 HEPATIC FUNCTION PANEL: CPT | Performed by: INTERNAL MEDICINE

## 2019-01-18 PROCEDURE — 25000128 H RX IP 250 OP 636: Performed by: INTERNAL MEDICINE

## 2019-01-18 PROCEDURE — 12000000 ZZH R&B MED SURG/OB

## 2019-01-18 PROCEDURE — 83605 ASSAY OF LACTIC ACID: CPT | Performed by: INTERNAL MEDICINE

## 2019-01-18 PROCEDURE — 87899 AGENT NOS ASSAY W/OPTIC: CPT | Performed by: HOSPITALIST

## 2019-01-18 PROCEDURE — 85027 COMPLETE CBC AUTOMATED: CPT | Performed by: INTERNAL MEDICINE

## 2019-01-18 RX ORDER — POTASSIUM CHLORIDE 1500 MG/1
40 TABLET, EXTENDED RELEASE ORAL ONCE
Status: COMPLETED | OUTPATIENT
Start: 2019-01-18 | End: 2019-01-18

## 2019-01-18 RX ADMIN — SODIUM CHLORIDE: 9 INJECTION, SOLUTION INTRAVENOUS at 21:05

## 2019-01-18 RX ADMIN — CARBAMAZEPINE 400 MG: 200 TABLET ORAL at 18:08

## 2019-01-18 RX ADMIN — AZITHROMYCIN MONOHYDRATE 250 MG: 500 INJECTION, POWDER, LYOPHILIZED, FOR SOLUTION INTRAVENOUS at 22:18

## 2019-01-18 RX ADMIN — POTASSIUM CHLORIDE 40 MEQ: 1500 TABLET, EXTENDED RELEASE ORAL at 09:01

## 2019-01-18 RX ADMIN — CARBAMAZEPINE 200 MG: 200 TABLET ORAL at 09:01

## 2019-01-18 RX ADMIN — SODIUM CHLORIDE: 9 INJECTION, SOLUTION INTRAVENOUS at 00:28

## 2019-01-18 RX ADMIN — CEFTRIAXONE SODIUM 2 G: 2 INJECTION, POWDER, FOR SOLUTION INTRAMUSCULAR; INTRAVENOUS at 21:05

## 2019-01-18 RX ADMIN — PANTOPRAZOLE SODIUM 40 MG: 40 INJECTION, POWDER, FOR SOLUTION INTRAVENOUS at 08:58

## 2019-01-18 RX ADMIN — PANTOPRAZOLE SODIUM 40 MG: 40 INJECTION, POWDER, FOR SOLUTION INTRAVENOUS at 20:00

## 2019-01-18 RX ADMIN — SODIUM CHLORIDE: 9 INJECTION, SOLUTION INTRAVENOUS at 08:57

## 2019-01-18 RX ADMIN — OXYCODONE HYDROCHLORIDE 10 MG: 5 TABLET ORAL at 00:23

## 2019-01-18 ASSESSMENT — ACTIVITIES OF DAILY LIVING (ADL)
ADLS_ACUITY_SCORE: 14

## 2019-01-18 NOTE — PLAN OF CARE
A&OX4, VSS on RA except for tachy in 115s. IVF at 125mL/hr. Afebrile this shift. Still c/o pain in the epigastric region, abd distended. A dose of Oxy given with some relief. Pt rates pain 10/10 but states it is tolerable and he can handle it. Hard to assess due to underlying cognitive impairment. Up with SBA. Calls appropriately. On clears. Continue to monitor.

## 2019-01-18 NOTE — PLAN OF CARE
A&O x4, anxious at times; pt legally blind at baseline. Heart rate tachycardic 120-130s, MD aware; temp max 100.8; all other VS stable on room air. IV fluids infusing. Abdominal distention present, abdominal discomfort reported by patient after eating, MD changed diet to clear liquids for this evening. Lung sounds diminished with crackles at bilateral bases; frequent cough. Pt given suppository for constipation with good result. Up with SBA. Discharge plan pending on improvement of labs, pneumonia symptoms and abdominal discomfort and distention. Pt's mother, father, and sister at bedside; supportive of patient.

## 2019-01-18 NOTE — PROGRESS NOTES
[facility]    Hospitalist Progress Note    Assessment & Plan   Chance Louis is a markedly pleasant 33 year old gentleman with childhood hydrocephalus status post  shunt placement, as well as seizure disorder and mild cognitive deficits who presents with cough, fever and abdominal pain and is found to have suspected sepsis due to right lower lobe pneumonia.     Sepsis due to right lower lobe pneumonia:   -Influenza PCR negative  -Chest x-ray/abdominal CT consistent with pneumonia of the medial right lower lobe  -Continue ceftriaxone and azithromycin  -Afebrile this morning, cultures negative  -Monitor fever profile  -Not hypoxic at this time  -Leukocytosis resolved    Probable acute pancreatitis  -Presenting with nonspecific abdominal pain and lipase of 964   -Unclear if this is due to acute pancreatitis or other etiologies  -Patient unsure if his pain is due to incessant coughing in the last few days   -Right upper quadrant ultrasonogram is unremarkable, no cholelithiasis although CBD is not adequately visualized  -Diet was changed to clear liquid diet because of some abdominal distention and pain with oral intake last night, patient appears to be improving, will upgrade to full liquid diet  -decrease IV fluids at 75 mL/h  -LFTs now normal    Hyponatremia:  -Most likely hypovolemic  -Improved with IV hydration     Normocytic anemia:   -Baseline appears to be normal between 14-15  -Hemoglobin at presentation was 12, lower at 10.5, stable.     Seizure disorder  Mental retardation  Congenital hydrocephalus status post   shunt placement in the remote past  Legally blind  -Continue prior to admission Tegretol   -no new issues at the moment.      # Pain Assessment:  Current Pain Score 1/18/2019   Patient currently in pain? yes   Pain score (0-10) 6   - Chance is experiencing pain due to abdominal pain. Pain management was discussed with Chance and his mother and the plan was created in a  collaborative fashion.  Chance's response to the current recommendations: engaged  - Please see the plan for pain management as documented above        D/W: RN  DVT Prophylaxis: Pneumatic Compression Devices  Code Status: Full Code    Disposition: Expected discharge in 1-3 days, pending clinical improvement.    Mike Leyva MD    Interval History    Afebrile.  Still endorses dry cough.  Patient is unsure if his abdominal pain is better or not but he feels slightly less distended, and had a good bowel movement last night.  Overall unsure if he is able to relate his symptoms accurately given his cognitive impairment.    -Data reviewed today: I reviewed all new labs and imaging results over the last 24 hours. I personally reviewed no images or EKG's today.      Physical Exam   Temp: 99.6  F (37.6  C) Temp src: Oral BP: (!) 137/92   Heart Rate: 111 Resp: 18 SpO2: 96 % O2 Device: None (Room air)    Vitals:    01/16/19 2055   Weight: 93.4 kg (206 lb)     Vital Signs with Ranges  Temp:  [98.6  F (37  C)-99.7  F (37.6  C)] 99.6  F (37.6  C)  Heart Rate:  [102-121] 111  Resp:  [16-20] 18  BP: (133-149)/(83-92) 137/92  SpO2:  [95 %-96 %] 96 %  I/O last 3 completed shifts:  In: 480 [P.O.:480]  Out: 300 [Urine:300]    Constitutional: AAOX3, NAD, appears anxious  HEENT:  No pallor or icterus  Neck- Supple, Good ROM, No JVD  Respiratory: Coarse breath sounds bilaterally, R>L, Normal WOB  Cardiovascular: Tachycardic, No murmur  GI: Soft, slightly distended, no focal tenderness, bowel sounds normoactive on all 4 quadrants, no guarding or rebound or rigidity  Skin/Integument: Warm and dry, no rashes  MSK: No joint deformity or swelling, no edema  Neuro: CN- grossly intact       Medications       sodium chloride 75 mL/hr at 01/18/19 0857         azithromycin  250 mg Intravenous Q24H     carBAMazepine  200 mg Oral QAM     carBAMazepine  400 mg Oral At Bedtime     cefTRIAXone  2 g Intravenous Q24H     pantoprazole (PROTONIX) IV   40 mg Intravenous BID     sodium chloride (PF)  3 mL Intracatheter Q8H       Data     Recent Labs   Lab 01/18/19  0731 01/17/19  0732 01/16/19  2110   WBC 9.5 18.3* 16.5*   HGB 10.5* 10.9* 12.0*   MCV 85 85 84    267 267    131* 125*   POTASSIUM 3.3* 3.5 3.4   CHLORIDE 104 99 91*   CO2 25 21 22   BUN 11 9 13   CR 0.79 0.84 0.84   ANIONGAP 7 11 12   IDALIA 7.8* 7.7* 8.2*   GLC 95 94 118*   ALBUMIN 2.2* 2.3* 2.8*   PROTTOTAL 6.1* 6.2* 7.2   BILITOTAL 0.5 1.4* 1.0   ALKPHOS 90 100 114   ALT 46 59 83*   AST 17 20 42   LIPASE 571* 511* 964*       No results found for this or any previous visit (from the past 24 hour(s)).

## 2019-01-18 NOTE — PLAN OF CARE
Pt is A&Ox4. Anxious. VSS ex tachycardic, on room air. C/o abdominal pain - Oxycodone given. LS- crackles to bilateral bases. Dry nonproductive cough. Clear liquid diet. Distended abdomen. Faint bowel sounds. Denies nausea. SBA. PIV infusing NS @ 125mL/hr. Lactic fired, 0.5. Rested well overnight.

## 2019-01-18 NOTE — PLAN OF CARE
Respiratory Compromise (Pneumonia)  Effective Oxygenation and Ventilation  1/18/2019 1739 - Improving by Aimee Tesfaye, RN   Tachycardia, some sporadic elevated BPs.  A/O, cognitive delay, legally blind, at times anxious. Ambulates SBA. Some abdominal pain, declines intervention, pt states it is improving so diet was advanced from full liquid to low fiber. Crackles in bases, R worse than L, sats upper 90s on RA Lipase elevated MD aware, will continue to monitor, probable pancreatitis.  MD aware of low potassium, one time 40mEq dose given.  Unable to collect sputum sample.

## 2019-01-19 VITALS
SYSTOLIC BLOOD PRESSURE: 144 MMHG | HEART RATE: 100 BPM | TEMPERATURE: 97.1 F | RESPIRATION RATE: 18 BRPM | WEIGHT: 208.6 LBS | BODY MASS INDEX: 34.75 KG/M2 | OXYGEN SATURATION: 95 % | HEIGHT: 65 IN | DIASTOLIC BLOOD PRESSURE: 95 MMHG

## 2019-01-19 LAB
ALBUMIN SERPL-MCNC: 2.5 G/DL (ref 3.4–5)
ALP SERPL-CCNC: 136 U/L (ref 40–150)
ALT SERPL W P-5'-P-CCNC: 62 U/L (ref 0–70)
ANION GAP SERPL CALCULATED.3IONS-SCNC: 8 MMOL/L (ref 3–14)
AST SERPL W P-5'-P-CCNC: 28 U/L (ref 0–45)
BILIRUB DIRECT SERPL-MCNC: 0.2 MG/DL (ref 0–0.2)
BILIRUB SERPL-MCNC: 0.5 MG/DL (ref 0.2–1.3)
BUN SERPL-MCNC: 8 MG/DL (ref 7–30)
CALCIUM SERPL-MCNC: 8.6 MG/DL (ref 8.5–10.1)
CHLORIDE SERPL-SCNC: 105 MMOL/L (ref 94–109)
CO2 SERPL-SCNC: 25 MMOL/L (ref 20–32)
CREAT SERPL-MCNC: 0.71 MG/DL (ref 0.66–1.25)
GFR SERPL CREATININE-BSD FRML MDRD: >90 ML/MIN/{1.73_M2}
GLUCOSE SERPL-MCNC: 108 MG/DL (ref 70–99)
LIPASE SERPL-CCNC: 447 U/L (ref 73–393)
MAGNESIUM SERPL-MCNC: 2.2 MG/DL (ref 1.6–2.3)
PHOSPHATE SERPL-MCNC: 3.5 MG/DL (ref 2.5–4.5)
POTASSIUM SERPL-SCNC: 3.5 MMOL/L (ref 3.4–5.3)
PROT SERPL-MCNC: 7.2 G/DL (ref 6.8–8.8)
SODIUM SERPL-SCNC: 138 MMOL/L (ref 133–144)

## 2019-01-19 PROCEDURE — C9113 INJ PANTOPRAZOLE SODIUM, VIA: HCPCS | Performed by: INTERNAL MEDICINE

## 2019-01-19 PROCEDURE — 84075 ASSAY ALKALINE PHOSPHATASE: CPT | Performed by: INTERNAL MEDICINE

## 2019-01-19 PROCEDURE — 25000132 ZZH RX MED GY IP 250 OP 250 PS 637: Performed by: HOSPITALIST

## 2019-01-19 PROCEDURE — 82247 BILIRUBIN TOTAL: CPT | Performed by: INTERNAL MEDICINE

## 2019-01-19 PROCEDURE — 83735 ASSAY OF MAGNESIUM: CPT | Performed by: INTERNAL MEDICINE

## 2019-01-19 PROCEDURE — 25000128 H RX IP 250 OP 636: Performed by: INTERNAL MEDICINE

## 2019-01-19 PROCEDURE — 84460 ALANINE AMINO (ALT) (SGPT): CPT | Performed by: INTERNAL MEDICINE

## 2019-01-19 PROCEDURE — 84450 TRANSFERASE (AST) (SGOT): CPT | Performed by: INTERNAL MEDICINE

## 2019-01-19 PROCEDURE — 80069 RENAL FUNCTION PANEL: CPT | Performed by: INTERNAL MEDICINE

## 2019-01-19 PROCEDURE — 99239 HOSP IP/OBS DSCHRG MGMT >30: CPT | Performed by: INTERNAL MEDICINE

## 2019-01-19 PROCEDURE — 80076 HEPATIC FUNCTION PANEL: CPT | Performed by: INTERNAL MEDICINE

## 2019-01-19 PROCEDURE — 84155 ASSAY OF PROTEIN SERUM: CPT | Performed by: INTERNAL MEDICINE

## 2019-01-19 PROCEDURE — 83690 ASSAY OF LIPASE: CPT | Performed by: INTERNAL MEDICINE

## 2019-01-19 PROCEDURE — 36415 COLL VENOUS BLD VENIPUNCTURE: CPT | Performed by: INTERNAL MEDICINE

## 2019-01-19 PROCEDURE — 82248 BILIRUBIN DIRECT: CPT | Performed by: INTERNAL MEDICINE

## 2019-01-19 RX ORDER — LEVOFLOXACIN 750 MG/1
750 TABLET, FILM COATED ORAL DAILY
Qty: 10 TABLET | Refills: 0 | Status: SHIPPED | OUTPATIENT
Start: 2019-01-19 | End: 2019-01-29

## 2019-01-19 RX ADMIN — CARBAMAZEPINE 200 MG: 200 TABLET ORAL at 08:52

## 2019-01-19 RX ADMIN — PANTOPRAZOLE SODIUM 40 MG: 40 INJECTION, POWDER, FOR SOLUTION INTRAVENOUS at 08:52

## 2019-01-19 ASSESSMENT — ACTIVITIES OF DAILY LIVING (ADL)
ADLS_ACUITY_SCORE: 14

## 2019-01-19 ASSESSMENT — MIFFLIN-ST. JEOR: SCORE: 1823.08

## 2019-01-19 NOTE — PLAN OF CARE
A&Ox4. VSS ex tachy at times and BP can be elevated. Pt legally blind and can be anxious at times. C/o  abdomen pain but declines intervention. Low fiber diet. Crackles in bases. Walked in the halls with family. Unable to collect sputum sample. SBA

## 2019-01-19 NOTE — DISCHARGE SUMMARY
[facility]    Discharge Summary  Hospitalist    Date of Admission:  1/16/2019  Date of Discharge:  1/19/2019 12:49 PM  Discharging Provider: Shashi Manzo MD  Date of Service (when I saw the patient): 01/19/19    Discharge Diagnoses   Community-acquired pneumonia involving right lower lobe  Sepsis secondary to pneumonia  Acute pancreatitis uncertain etiology, possibly alcohol related    History of Present Illness   Chance Louis is an 33 year old male who presented with cough fever and abdominal pain.    Hospital Course        Chance Louis is a markedly pleasant 33 year old gentleman with childhood hydrocephalus status post  shunt placement, as well as seizure disorder and mild cognitive deficits who presents with cough, fever and abdominal pain and is found to have suspected sepsis due to right lower lobe pneumonia.    Final discharge diagnoses and hospital course     Sepsis due to right lower lobe pneumonia:   -Influenza PCR negative  -Chest x-ray/abdominal CT consistent with pneumonia of the medial right lower lobe  -Started on IV ceftriaxone and azithromycin upon it well  -Afebrile this morning, cultures negative  -Monitor fever profile  -Not hypoxic at this time  -Leukocytosis  resolved    Is doing well at this time.  There is no coughing no fever no chills no nausea vomiting or shortness of breath at this time.  Tolerating oral diet well.  He will be discharged on oral levofloxacin to complete the course.  As outpatient     Acute pancreatitis  -Presenting with nonspecific abdominal pain and lipase of 964   -Cause is uncertain, does not drink alcohol or beer on the weekends.  -CT scan and ultrasound negative for any cholelithiasis or choledocholithiasis.  -Right upper quadrant ultrasonogram is unremarkable, no cholelithiasis although CBD is not adequately visualized  With IV fluids n.p.o. and pain medication now the diet is advanced tolerating general diet well.  Lipase is trending  down LFTs are normal.  Counseled him to not to drink alcohol at this time.       Hyponatremia:  -Most likely hypovolemic now resolved  -Improved with IV hydration     Normocytic anemia:   -Baseline appears to be normal between 14-15  -Hemoglobin at presentation was 12, lower at 10.5, stable, most likely dilutional.       Seizure disorder  Mental retardation  Congenital hydrocephalus status post   shunt placement in the remote past  Legally blind  -Continue prior to admission Tegretol   -no new issues at the moment      Patient is doing well this morning no nausea vomiting abdominal pain fever chills cough or shortness of breath  He will be discharged home on oral antibiotic levofloxacin to complete the course  Follow with PCP in 1 week.    Shashi Manzo MD    Significant Results and Procedures       Pending Results   These results will be followed up by PCP  Unresulted Labs Ordered in the Past 30 Days of this Admission     Date and Time Order Name Status Description    1/17/2019 1417 Blood culture Preliminary           Code Status   Full Code       Primary Care Physician   Physician No Ref-Primary    Physical Exam   Temp: 97.1  F (36.2  C) Temp src: Oral BP: (!) 144/95 Pulse: 100 Heart Rate: 102 Resp: 18 SpO2: 95 % O2 Device: None (Room air)    Vitals:    01/16/19 2055 01/19/19 0627   Weight: 93.4 kg (206 lb) 94.6 kg (208 lb 9.6 oz)     Vital Signs with Ranges  Temp:  [97.1  F (36.2  C)-98.3  F (36.8  C)] 97.1  F (36.2  C)  Pulse:  [100] 100  Heart Rate:  [102-110] 102  Resp:  [17-18] 18  BP: (144-160)/(93-98) 144/95  SpO2:  [95 %-97 %] 95 %  I/O last 3 completed shifts:  In: 500 [P.O.:500]  Out: 650 [Urine:650]    Constitutional: awake, alert, cooperative, no apparent distress, and appears stated age  Eyes: Legally blind  Hematologic / Lymphatic: no cervical lymphadenopathy  Respiratory: crackles right base  Cardiovascular: Normal apical impulse, regular rate and rhythm, normal S1 and S2, no S3 or S4, and no  murmur noted  Skin: no bruising or bleeding  Neurologic: No focal deficit    Discharge Disposition   Discharged to home  Condition at discharge: Stable    Consultations This Hospital Stay   None    Time Spent on this Encounter   IShashi, personally saw the patient today and spent greater than 30 minutes discharging this patient.    Discharge Orders      Reason for your hospital stay    PNA     Follow-up and recommended labs and tests     Follow up with primary care provider, Physician No Ref-Primary, within 7 days for hospital follow- up.  No follow up labs or test are needed.     Activity    Your activity upon discharge: activity as tolerated     Full Code     Diet    Follow this diet upon discharge: Orders Placed This Encounter      Advance Diet as Tolerated: Low Fiber     Discharge Medications   Discharge Medication List as of 1/19/2019 11:52 AM      START taking these medications    Details   levofloxacin (LEVAQUIN) 750 MG tablet Take 1 tablet (750 mg) by mouth daily for 10 days, Disp-10 tablet, R-0, E-Prescribe         CONTINUE these medications which have NOT CHANGED    Details   !! carBAMazepine (TEGRETOL) 200 MG tablet Take 200 mg by mouth every morning, Historical      !! carBAMazepine (TEGRETOL) 200 MG tablet Take 400 mg by mouth At Bedtime, Historical      Cholecalciferol (VITAMIN D PO) Take 1 tablet by mouth daily, Historical      loratadine (CLARITIN) 10 MG tablet Take 10 mg by mouth daily, Historical      multivitamin, therapeutic (THERA-VIT) TABS tablet Take 1 tablet by mouth daily, Historical       !! - Potential duplicate medications found. Please discuss with provider.        Allergies   No Known Allergies  Data   Most Recent 3 CBC's:  Recent Labs   Lab Test 01/18/19  0731 01/17/19  0732 01/16/19  2110   WBC 9.5 18.3* 16.5*   HGB 10.5* 10.9* 12.0*   MCV 85 85 84    267 267      Most Recent 3 BMP's:  Recent Labs   Lab Test 01/19/19  0957 01/18/19  0731 01/17/19  0732    136 131*    POTASSIUM 3.5 3.3* 3.5   CHLORIDE 105 104 99   CO2 25 25 21   BUN 8 11 9   CR 0.71 0.79 0.84   ANIONGAP 8 7 11   IDALIA 8.6 7.8* 7.7*   * 95 94     Most Recent 2 LFT's:  Recent Labs   Lab Test 01/19/19  0957 01/18/19  0731   AST 28 17   ALT 62 46   ALKPHOS 136 90   BILITOTAL 0.5 0.5     Most Recent INR's and Anticoagulation Dosing History:  Anticoagulation Dose History     There is no flowsheet data to display.        Most Recent 3 Troponin's:No lab results found.  Most Recent Cholesterol Panel:No lab results found.  Most Recent 6 Bacteria Isolates From Any Culture (See EPIC Reports for Culture Details):  Recent Labs   Lab Test 01/17/19  1444   CULT No growth after 2 days     Most Recent TSH, T4 and A1c Labs:No lab results found.  Results for orders placed or performed during the hospital encounter of 01/16/19   CT Abdomen Pelvis w Contrast    Narrative    CT ABDOMEN AND PELVIS WITH CONTRAST   1/16/2019 10:02 PM     HISTORY: Abdominal pain, distention and fever. Elevated white blood  cell count.    COMPARISON: None.    TECHNIQUE: Following the uneventful administration of 103 mL  Isovue-370 intravenous contrast, helical sections were acquired from  the top of the diaphragm through the pubic symphysis. Coronal  reconstructions were generated. Radiation dose for this scan was  reduced using automated exposure control, adjustment of the mA and/or  kV according to the patient's size, or iterative reconstruction  technique.    FINDINGS:     Abdomen: The liver, spleen, pancreas, adrenal glands and kidneys are  unremarkable. The gallbladder is present. No enlarged lymph nodes or  free fluid in the upper abdomen. A ventriculoperitoneal shunt catheter  is present coursing in the right anterior abdominal wall with distal  tip in the upper right hemiabdomen.    Scan through the lower chest is significant for partial visualization  of a small to moderate-sized region of airspace opacities in the  medial aspect of the  right lower lobe.    Pelvis: The small and large bowel are normal in caliber. The appendix  is unremarkable. No bowel wall thickening, pneumatosis or free  intraperitoneal gas. No enlarged lymph nodes or free fluid in the  pelvis.      Impression    IMPRESSION:   1. Partial visualization of a small to moderate-sized region of  airspace opacities in the right lung base, likely representing  pneumonia.  2. No other cause of acute pain identified in the abdomen or pelvis.  The appendix is unremarkable.    MERRY MCKEON MD   XR Chest 2 Views    Narrative    CHEST TWO VIEWS  1/17/2019 8:37 AM     HISTORY: 33-year-old patient with pneumonia suspected on CT exam.    COMPARISON: None       Impression    IMPRESSION: Consolidation is noted in the medial right lower lobe  corresponding to pneumonia. Recommend continued radiographic  surveillance until resolution. No pneumothorax or pleural effusion.  Catheter is noted overlying the right hemithorax, discontinuous  overlying the right lung apex, likely a previous  shunt.    ROGELIO MEZA MD   US Abdomen Limited    Narrative    RIGHT UPPER QUADRANT ULTRASOUND 1/17/2019 9:03 AM    HISTORY:  Elevated lipase.    COMPARISON: CT of the abdomen pelvis dated 1/16/2019    FINDINGS:    Gallbladder: Suboptimal visualization but grossly unremarkable.    Bile ducts:  The common bile duct is not able to be visualized.    Liver:  Unremarkable    Pancreas:  The tail is obscured by overlying bowel gas. Head and body  of the pancreas appear grossly unremarkable.    Right kidney:  Unremarkable    Abdominal aorta/inferior vena cava Unremarkable      Impression    IMPRESSION:  No definite acute abnormality. The common bile duct is  not adequately visualized but appeared unremarkable on the previous  CT.    KATARINA SINGH MD     Most Recent 3 CBC's:  Recent Labs   Lab Test 01/18/19  0731 01/17/19  0732 01/16/19  2110   WBC 9.5 18.3* 16.5*   HGB 10.5* 10.9* 12.0*   MCV 85 85 84    267  267     Most Recent 3 BMP's:  Recent Labs   Lab Test 01/19/19  0957 01/18/19  0731 01/17/19  0732    136 131*   POTASSIUM 3.5 3.3* 3.5   CHLORIDE 105 104 99   CO2 25 25 21   BUN 8 11 9   CR 0.71 0.79 0.84   ANIONGAP 8 7 11   IDALIA 8.6 7.8* 7.7*   * 95 94

## 2019-01-19 NOTE — PLAN OF CARE
Adult Inpatient Plan of Care  Plan of Care Review  1/19/2019 1424 - Adequate for Discharge by Aimee Tesfaye, RN   Discharge instructions reviewed with patient and mother, questions answered, pt to  antibiotic at home pharmacy.  Pt discharged home with mother to transport.

## 2019-01-23 LAB
BACTERIA SPEC CULT: NO GROWTH
Lab: NORMAL
SPECIMEN SOURCE: NORMAL